# Patient Record
Sex: MALE | Race: ASIAN | URBAN - METROPOLITAN AREA
[De-identification: names, ages, dates, MRNs, and addresses within clinical notes are randomized per-mention and may not be internally consistent; named-entity substitution may affect disease eponyms.]

---

## 2024-05-13 ENCOUNTER — HOSPITAL ENCOUNTER (OUTPATIENT)
Age: 39
Discharge: HOME | End: 2024-05-13

## 2024-05-13 VITALS
HEART RATE: 82 BPM | DIASTOLIC BLOOD PRESSURE: 98 MMHG | OXYGEN SATURATION: 99 % | SYSTOLIC BLOOD PRESSURE: 148 MMHG | RESPIRATION RATE: 19 BRPM | TEMPERATURE: 98 F

## 2024-05-13 DIAGNOSIS — J02.0 STREP PHARYNGITIS: Primary | ICD-10-CM

## 2024-05-13 DIAGNOSIS — R03.0 ELEVATED BLOOD PRESSURE READING: ICD-10-CM

## 2024-05-13 LAB — S PYO AG THROAT QL IA.RAPID: POSITIVE

## 2024-05-13 PROCEDURE — 99203 OFFICE O/P NEW LOW 30 MIN: CPT

## 2024-05-13 PROCEDURE — 87651 STREP A DNA AMP PROBE: CPT | Performed by: PHYSICIAN ASSISTANT

## 2024-05-13 RX ORDER — AMOXICILLIN 875 MG/1
875 TABLET, COATED ORAL 2 TIMES DAILY
Qty: 20 TABLET | Refills: 0 | Status: SHIPPED | OUTPATIENT
Start: 2024-05-13 | End: 2024-05-23

## 2024-05-13 NOTE — ED PROVIDER NOTES
Patient Seen in: [facility]      History     Chief Complaint   Patient presents with    Sore Throat     Stated Complaint: Sore throat    Subjective:   HPI    38-year-old male presenting for evaluation of URI symptoms.  Reports subjective fevers, sore throat and painful swallowing for the last 5 to 6 days.  Throat worsening overnight.  He denies nasal congestion, cough or neck pain.     Objective:   No pertinent past medical history.            No pertinent past surgical history.              No pertinent social history.            Review of Systems    Positive for stated complaint: Sore throat  Other systems are as noted in HPI.  Constitutional and vital signs reviewed.      All other systems reviewed and negative except as noted above.    Physical Exam     ED Triage Vitals [05/13/24 0840]   BP (!) 136/108   Pulse 82   Resp 19   Temp 98.2 °F (36.8 °C)   Temp src Temporal   SpO2 99 %   O2 Device        Current Vitals:   Vital Signs  BP: (!) 148/98  Pulse: 82  Resp: 19  Temp: 98.2 °F (36.8 °C)  Temp src: Temporal    Oxygen Therapy  SpO2: 99 %            Physical Exam  Vitals and nursing note reviewed.   Constitutional:       General: He is not in acute distress.  HENT:      Head: Normocephalic and atraumatic.      Right Ear: External ear normal.      Left Ear: External ear normal.      Nose: Nose normal.      Mouth/Throat:      Mouth: Mucous membranes are moist.      Tonsils: Tonsillar exudate present. 2+ on the right. 2+ on the left.   Eyes:      Extraocular Movements: Extraocular movements intact.      Pupils: Pupils are equal, round, and reactive to light.   Cardiovascular:      Rate and Rhythm: Normal rate.   Pulmonary:      Effort: Pulmonary effort is normal.   Abdominal:      General: Abdomen is flat.   Musculoskeletal:         General: Normal range of motion.      Cervical back: Normal range of motion.   Skin:     General: Skin is warm.   Neurological:      General: No focal deficit present.       Mental Status: He is alert and oriented to person, place, and time.   Psychiatric:         Mood and Affect: Mood normal.         Behavior: Behavior normal.               ED Course     Labs Reviewed   RAPID STREP A - Abnormal; Notable for the following components:       Result Value    Strep A by PCR Positive (*)     All other components within normal limits        38-year-old male presenting for evaluation of sore throat, subjective fevers.  Posterior oropharynx with 2+ tonsils bilaterally,+ exudates.    Ddx- viral pharyngitis, strep pharyngitis, covid    Strep+  Amoxicillin Rx'd.  Supportive care, anticipatory guidance.  Patient BP noted to be elevated in the IC.  Recommended BP monitoring and PCP follow-up.           MDM                                         Medical Decision Making      Disposition and Plan     Clinical Impression:  1. Strep pharyngitis    2. Elevated blood pressure reading         Disposition:  Discharge  5/13/2024  9:08 am    Follow-up:  Staci Renteria MD  [address] [facility] [address] [phone]                Medications Prescribed:  Discharge Medication List as of 5/13/2024  9:08 AM        START taking these medications    Details   amoxicillin 875 MG Oral Tab Take 1 tablet (875 mg total) by mouth 2 (two) times daily for 10 days., Normal, Disp-20 tablet, R-0

## 2024-05-13 NOTE — ED INITIAL ASSESSMENT (HPI)
Patient with low grade fever on Tuesday. Developed sore throat on Thursday, has snoring at night.  No SOB  Some discomfort with swallowing